# Patient Record
Sex: FEMALE | Race: WHITE | Employment: PART TIME | URBAN - METROPOLITAN AREA
[De-identification: names, ages, dates, MRNs, and addresses within clinical notes are randomized per-mention and may not be internally consistent; named-entity substitution may affect disease eponyms.]

---

## 2024-09-07 ENCOUNTER — OFFICE VISIT (OUTPATIENT)
Dept: URGENT CARE | Facility: CLINIC | Age: 24
End: 2024-09-07
Payer: COMMERCIAL

## 2024-09-07 VITALS
RESPIRATION RATE: 18 BRPM | DIASTOLIC BLOOD PRESSURE: 80 MMHG | WEIGHT: 212.8 LBS | BODY MASS INDEX: 36.33 KG/M2 | HEART RATE: 83 BPM | SYSTOLIC BLOOD PRESSURE: 118 MMHG | OXYGEN SATURATION: 99 % | HEIGHT: 64 IN | TEMPERATURE: 100 F

## 2024-09-07 DIAGNOSIS — H60.501 ACUTE OTITIS EXTERNA OF RIGHT EAR, UNSPECIFIED TYPE: Primary | ICD-10-CM

## 2024-09-07 PROCEDURE — G0383 LEV 4 HOSP TYPE B ED VISIT: HCPCS | Performed by: NURSE PRACTITIONER

## 2024-09-07 PROCEDURE — 99284 EMERGENCY DEPT VISIT MOD MDM: CPT | Performed by: NURSE PRACTITIONER

## 2024-09-07 RX ORDER — OFLOXACIN 3 MG/ML
10 SOLUTION AURICULAR (OTIC) DAILY
Qty: 3.5 ML | Refills: 0 | Status: SHIPPED | OUTPATIENT
Start: 2024-09-07 | End: 2024-09-14

## 2024-09-07 NOTE — PROGRESS NOTES
[facility]        NAME: Santiago Myrick is a 24 y.o. female  : 2000    MRN: [id number]  DATE: 2024  TIME: 10:23 AM    Assessment and Plan   Acute otitis externa of right ear, unspecified type [H60.501]  1. Acute otitis externa of right ear, unspecified type  ofloxacin (FLOXIN) 0.3 % otic solution            Patient Instructions       Take med as prescribed  Tylenol or motrin prn for pain  Follow up with PCP in 3-5 days.  Proceed to  ER if symptoms worsen.    If tests have been performed at [facility], our office will contact you with results if changes need to be made to the care plan discussed with you at the visit.  [facility]    Chief Complaint     Chief Complaint   Patient presents with    Earache     Started this morning with her right ear feeling clogged, swollen, and drainage.          History of Present Illness       HPI  Reports right ear pain that started this morning. Last night the ear felt like it was closing. There was liquid drainage from the right ear this morning. Pain is 7/10. Pain also of the right auricle, with some swelling and along the right side of the neck.       Review of Systems   Review of Systems   Constitutional:  Negative for fever.   HENT:  Positive for ear discharge and ear pain. Negative for congestion, postnasal drip and sinus pressure.    Respiratory:  Negative for cough.          Current Medications       Current Outpatient Medications:     ofloxacin (FLOXIN) 0.3 % otic solution, Administer 10 drops to the right ear daily for 7 days, Disp: 3.5 mL, Rfl: 0    Current Allergies     Allergies as of 2024    (No Known Allergies)            The following portions of the patient's history were reviewed and updated as appropriate: allergies, current medications, past family history, past medical history, past social history, past surgical history and problem list.     No past medical history on  "file.    No past surgical history on file.    No family history on file.      Medications have been verified.        Objective   /80 (BP Location: Right arm, Patient Position: Sitting, Cuff Size: Large)   Pulse 83   Temp 100 °F (37.8 °C) (Tympanic)   Resp 18   Ht 5' 4\" (1.626 m)   Wt 96.5 kg (212 lb 12.8 oz)   LMP 09/07/2024   SpO2 99%   BMI 36.53 kg/m²   Patient's last menstrual period was 09/07/2024.       Physical Exam     Physical Exam  Constitutional:       General: She is not in acute distress.  HENT:      Head:      Comments: Right tonsillar swollen lymph node.      Left Ear: Tympanic membrane normal.      Ears:      Comments: Right ear canal is swollen and wet.     Nose: No congestion or rhinorrhea.   Cardiovascular:      Rate and Rhythm: Regular rhythm.      Heart sounds: Normal heart sounds.   Pulmonary:      Effort: Pulmonary effort is normal.      Breath sounds: Normal breath sounds.                   "

## 2024-09-09 ENCOUNTER — HOSPITAL ENCOUNTER (EMERGENCY)
Facility: HOSPITAL | Age: 24
Discharge: HOME | End: 2024-09-09
Attending: EMERGENCY MEDICINE
Payer: COMMERCIAL

## 2024-09-09 VITALS
SYSTOLIC BLOOD PRESSURE: 147 MMHG | TEMPERATURE: 97.5 F | HEART RATE: 82 BPM | RESPIRATION RATE: 17 BRPM | OXYGEN SATURATION: 100 % | DIASTOLIC BLOOD PRESSURE: 93 MMHG

## 2024-09-09 DIAGNOSIS — H60.91 RIGHT OTITIS EXTERNA: Primary | ICD-10-CM

## 2024-09-09 PROCEDURE — 99284 EMERGENCY DEPT VISIT MOD MDM: CPT | Performed by: EMERGENCY MEDICINE

## 2024-09-09 PROCEDURE — 96372 THER/PROPH/DIAG INJ SC/IM: CPT

## 2024-09-09 PROCEDURE — 99282 EMERGENCY DEPT VISIT SF MDM: CPT

## 2024-09-09 RX ORDER — OXYCODONE HYDROCHLORIDE 5 MG/1
5 TABLET ORAL ONCE
Status: COMPLETED | OUTPATIENT
Start: 2024-09-09 | End: 2024-09-09

## 2024-09-09 RX ORDER — KETOROLAC TROMETHAMINE 30 MG/ML
30 INJECTION, SOLUTION INTRAMUSCULAR; INTRAVENOUS ONCE
Status: COMPLETED | OUTPATIENT
Start: 2024-09-09 | End: 2024-09-09

## 2024-09-09 RX ORDER — OXYCODONE HYDROCHLORIDE 5 MG/1
5 TABLET ORAL EVERY 4 HOURS PRN
Qty: 18 TABLET | Refills: 0 | Status: SHIPPED | OUTPATIENT
Start: 2024-09-09

## 2024-09-09 RX ORDER — CIPROFLOXACIN AND DEXAMETHASONE 3; 1 MG/ML; MG/ML
4 SUSPENSION/ DROPS AURICULAR (OTIC) EVERY 12 HOURS SCHEDULED
Status: DISCONTINUED | OUTPATIENT
Start: 2024-09-09 | End: 2024-09-09 | Stop reason: HOSPADM

## 2024-09-09 RX ORDER — KETOROLAC TROMETHAMINE 30 MG/ML
15 INJECTION, SOLUTION INTRAMUSCULAR; INTRAVENOUS ONCE
Status: DISCONTINUED | OUTPATIENT
Start: 2024-09-09 | End: 2024-09-09

## 2024-09-09 RX ADMIN — CIPROFLOXACIN AND DEXAMETHASONE 4 DROP: 3; 1 SUSPENSION/ DROPS AURICULAR (OTIC) at 08:28

## 2024-09-09 RX ADMIN — OXYCODONE HYDROCHLORIDE 5 MG: 5 TABLET ORAL at 08:23

## 2024-09-09 RX ADMIN — KETOROLAC TROMETHAMINE 30 MG: 30 INJECTION, SOLUTION INTRAMUSCULAR; INTRAVENOUS at 08:24

## 2024-09-09 NOTE — ED PROVIDER NOTES
History  Chief Complaint   Patient presents with    Earache     Pt c/o R earache since Saturday.      24-year-old female presents emergency department for evaluation of right-sided earache.  Patient states she was recently at urgent care they diagnosed her with  otitis externa and gave her antibiotic eardrops which she has been using.  She states that her ear has become more painful and is now closed and she can barely hear from the right side.  She says the drops were unable to make it into her ear due to the swelling.  No tinnitus.  She states that the ear is very tender and painful.  She states anytime she opens her jaw or chews she feels pain in the right side of her ear.  She denies fever or chills.  No overlying skin changes per her account.  She is not noted any swelling other than the ear itself.  She has not believe ear looks red.  She still having some clear discharge from the right side.  No pain or issues with the left ear.        Prior to Admission Medications   Prescriptions Last Dose Informant Patient Reported? Taking?   ofloxacin (FLOXIN) 0.3 % otic solution   No No   Sig: Administer 10 drops to the right ear daily for 7 days      Facility-Administered Medications: None       No past medical history on file.    No past surgical history on file.    No family history on file.  I have reviewed and agree with the history as documented.    E-Cigarette/Vaping     E-Cigarette/Vaping Substances     Social History     Tobacco Use    Smoking status: Never    Smokeless tobacco: Never   Substance Use Topics    Alcohol use: Not Currently    Drug use: Never        Review of Systems   Constitutional:  Negative for activity change, chills and fatigue.   HENT:  Positive for ear discharge, ear pain and hearing loss. Negative for congestion, facial swelling, sore throat and tinnitus.    Eyes:  Negative for photophobia.   Respiratory:  Negative for shortness of breath.    Gastrointestinal:  Negative for abdominal pain,  nausea and vomiting.   Musculoskeletal:  Negative for neck pain and neck stiffness.   Skin:  Negative for rash and wound.   Neurological:  Negative for dizziness, tremors, syncope, light-headedness, numbness and headaches.   Psychiatric/Behavioral:  Negative for agitation and confusion.        Physical Exam  ED Triage Vitals   Temperature Pulse Respirations Blood Pressure SpO2   09/09/24 0721 09/09/24 0722 09/09/24 0722 09/09/24 0722 09/09/24 0722   97.5 °F (36.4 °C) 82 17 147/93 100 %      Temp Source Heart Rate Source Patient Position - Orthostatic VS BP Location FiO2 (%)   09/09/24 0721 09/09/24 0722 09/09/24 0722 09/09/24 0722 --   Tympanic Monitor Lying Right arm       Pain Score       09/09/24 0721       9             Orthostatic Vital Signs  Vitals:    09/09/24 0722   BP: 147/93   Pulse: 82   Patient Position - Orthostatic VS: Lying       Physical Exam  Vitals reviewed.   Constitutional:       General: She is not in acute distress.     Appearance: Normal appearance. She is not ill-appearing.   HENT:      Head: Normocephalic and atraumatic.      Left Ear: Tympanic membrane, ear canal and external ear normal.      Ears:      Comments: Unable to visualize right TM due to edema of the right outer ear.  No erythema noted.  There is some clear discharge over the right ear.  Tenderness to palpation of the tragus.      Nose: Nose normal.      Mouth/Throat:      Mouth: Mucous membranes are moist.      Pharynx: Oropharynx is clear. No oropharyngeal exudate or posterior oropharyngeal erythema.   Cardiovascular:      Rate and Rhythm: Normal rate and regular rhythm.   Pulmonary:      Effort: Pulmonary effort is normal.   Musculoskeletal:      Cervical back: Normal range of motion. No rigidity or tenderness.   Skin:     General: Skin is warm and dry.      Findings: No erythema, lesion or rash.   Neurological:      Mental Status: She is alert and oriented to person, place, and time.         ED Medications  Medications    oxyCODONE (ROXICODONE) IR tablet 5 mg (5 mg Oral Given 9/9/24 0823)   ketorolac (TORADOL) injection 30 mg (30 mg Intramuscular Given 9/9/24 0824)       Diagnostic Studies  Results Reviewed       None                   No orders to display         Procedures  Procedures      ED Course                                       Medical Decision Making  24-year-old female presents the emergency department for evaluation of worsening otitis externa.  On examination her ear canals nearly occluded, however I agree with the diagnosis of externa externa.  Unable to visualize TM on exam, however: Doubt otitis media given her presentation.  Wick was placed here which patient tolerated well.  Medication was changed to Ciprodex has a steroid component will certainly add some benefit given her edematous ear canal.  ENT follow-up/referral was placed with the patient.  We reviewed discharge instructions and all patient questions answered.  She remained hemodynamically stable during her time in the emergency department and is appropriate for discharge home with outpatient follow-up instructions and clear emergency room return precautions.    Risk  Prescription drug management.          Disposition  Final diagnoses:   Right otitis externa     Time reflects when diagnosis was documented in both MDM as applicable and the Disposition within this note       Time User Action Codes Description Comment    9/9/2024  8:09 AM Fabricio Berry Add [H60.91] Right otitis externa           ED Disposition       ED Disposition   Discharge    Condition   Stable    Date/Time   Mon Sep 9, 2024  9:18 AM    Comment   Santiago Myrick discharge to home/self care.                   Follow-up Information       Follow up With Specialties Details Why Contact Info Additional Information    [facility] Emergency Department Emergency Medicine Go to  If symptoms worsen [address] [place] [address] [id number] [phone] [facility]  [facility] Emergency Department, [address], [place], [address]   [phone]            Discharge Medication List as of 9/9/2024  9:25 AM        START taking these medications    Details   oxyCODONE (Roxicodone) 5 immediate release tablet Take 1 tablet (5 mg total) by mouth every 4 (four) hours as needed for severe pain for up to 18 doses Max Daily Amount: 30 mg, Starting Mon 9/9/2024, Normal           CONTINUE these medications which have NOT CHANGED    Details   ofloxacin (FLOXIN) 0.3 % otic solution Administer 10 drops to the right ear daily for 7 days, Starting Sat 9/7/2024, Until Sat 9/14/2024, Normal               PDMP Review       None             ED Provider  Attending physically available and evaluated Santiago Myrick. I managed the patient along with the ED Attending.    Electronically Signed by           Jaya Garcia MD  09/09/24 9928

## 2024-09-09 NOTE — Clinical Note
Santiago Myrick was seen and treated in our emergency department on 9/9/2024.                Diagnosis:     Santiago  may return to work on return date.    She may return on this date: 09/10/2024         If you have any questions or concerns, please don't hesitate to call.      Jaya Garcia MD    ______________________________           _______________          _______________  Hospital Representative                              Date                                Time

## 2024-09-11 NOTE — ED ATTENDING ATTESTATION
9/9/2024  I, Fabricio Berry MD, saw and evaluated the patient. I have discussed the patient with the resident/non-physician practitioner and agree with the resident's/non-physician practitioner's findings, Plan of Care, and MDM as documented in the resident's/non-physician practitioner's note, except where noted. All available labs and Radiology studies were reviewed.  I was present for key portions of any procedure(s) performed by the resident/non-physician practitioner and I was immediately available to provide assistance.       At this point I agree with the current assessment done in the Emergency Department.  I have conducted an independent evaluation of this patient a history and physical is as follows:    ED Course     Impression: Right earache, history of otitis externa  Differential diagnosis: Otitis externa, doubt LUIS, doubt mastoiditis    Patient presents with worsening right ear pain and swelling.  Patient recently diagnosed with otitis externa and placed on ofloxacin drops    Physical examination: Right external auditory canal is severely swollen and occluded with debris and swelling.    No mastoid tenderness.  External pinna is not red erythematous or swollen.    I discussed my impressions with patient plan to give patient trial rescue analgesia will switch antibiotic coverage to Ciprodex otic suspension.  Place Dillon ear wick catheter into right external auditory canal to facilitate medication delivery.    An ambulatory referral was placed to ENT for follow-up.    Given severity of symptoms a short course of oxycodone was also sent to patient's pharmacy for breakthrough pain    Critical Care Time  Procedures

## 2024-09-13 ENCOUNTER — OFFICE VISIT (OUTPATIENT)
Dept: URGENT CARE | Facility: CLINIC | Age: 24
End: 2024-09-13
Payer: COMMERCIAL

## 2024-09-13 VITALS
TEMPERATURE: 99.3 F | DIASTOLIC BLOOD PRESSURE: 92 MMHG | RESPIRATION RATE: 18 BRPM | SYSTOLIC BLOOD PRESSURE: 132 MMHG | HEART RATE: 70 BPM | OXYGEN SATURATION: 99 %

## 2024-09-13 DIAGNOSIS — H66.93 ACUTE INFECTION OF BOTH EARS: Primary | ICD-10-CM

## 2024-09-13 PROCEDURE — 99283 EMERGENCY DEPT VISIT LOW MDM: CPT | Performed by: NURSE PRACTITIONER

## 2024-09-13 PROCEDURE — G0382 LEV 3 HOSP TYPE B ED VISIT: HCPCS | Performed by: NURSE PRACTITIONER

## 2024-09-13 RX ORDER — AMOXICILLIN 875 MG
875 TABLET ORAL 2 TIMES DAILY
Qty: 20 TABLET | Refills: 0 | Status: SHIPPED | OUTPATIENT
Start: 2024-09-13 | End: 2024-09-23

## 2024-09-13 NOTE — PROGRESS NOTES
[facility]        NAME: Santiago Myrick is a 24 y.o. female  : 2000    MRN: [id number]  DATE: 2024  TIME: 6:41 PM    Assessment and Plan   Acute infection of both ears [H66.93]  1. Acute infection of both ears  amoxicillin (AMOXIL) 875 mg tablet            Patient Instructions       Cont with ear drops  Start oral antibiotics  OTC med for pain prn  Call ENT and make appt as recommended by ED staff   Follow up with PCP in 3-5 days.  Proceed to  ER if symptoms worsen.    If tests have been performed at [facility], our office will contact you with results if changes need to be made to the care plan discussed with you at the visit.  [facility]    Chief Complaint     Chief Complaint   Patient presents with    Earache     Patient has continued right ear pain, yesterday left ear became painful. Was in ED on  where they placed an ear wick in right ear          History of Present Illness       HPI  She was treated at this clinic about 6 days ago and prescribed ear drops for right external ear infection. 2 days letter she went to the Ed and the antibiotics was changed to ciprodex. Today she states the pain she is having has moved from the right ear to the left ear.       Review of Systems   Review of Systems   Constitutional:  Negative for fever.   HENT:  Positive for ear pain and hearing loss. Negative for congestion, postnasal drip and sore throat.    Respiratory:  Negative for shortness of breath.    Neurological:  Negative for light-headedness and headaches.         Current Medications       Current Outpatient Medications:     amoxicillin (AMOXIL) 875 mg tablet, Take 1 tablet (875 mg total) by mouth 2 (two) times a day for 10 days, Disp: 20 tablet, Rfl: 0    ofloxacin (FLOXIN) 0.3 % otic solution, Administer 10 drops to the right ear daily for 7 days, Disp: 3.5 mL, Rfl: 0    oxyCODONE (Roxicodone) 5 immediate release tablet, Take 1  tablet (5 mg total) by mouth every 4 (four) hours as needed for severe pain for up to 18 doses Max Daily Amount: 30 mg, Disp: 18 tablet, Rfl: 0    Current Allergies     Allergies as of 09/13/2024    (No Known Allergies)            The following portions of the patient's history were reviewed and updated as appropriate: allergies, current medications, past family history, past medical history, past social history, past surgical history and problem list.     No past medical history on file.    No past surgical history on file.    No family history on file.      Medications have been verified.        Objective   /92   Pulse 70   Temp 99.3 °F (37.4 °C)   Resp 18   LMP 09/07/2024   SpO2 99%   Patient's last menstrual period was 09/07/2024.       Physical Exam     Physical Exam  HENT:      Ears:      Comments: Moderate swelling of the right ear canal, improved from previous visit. Mild swelling of the left ear canal. No erythema in both canals. Ear-wick present in the right canal      Nose: No rhinorrhea.   Cardiovascular:      Rate and Rhythm: Regular rhythm.      Heart sounds: Normal heart sounds.   Pulmonary:      Effort: Pulmonary effort is normal.      Breath sounds: Normal breath sounds.